# Patient Record
Sex: FEMALE | HISPANIC OR LATINO | URBAN - METROPOLITAN AREA
[De-identification: names, ages, dates, MRNs, and addresses within clinical notes are randomized per-mention and may not be internally consistent; named-entity substitution may affect disease eponyms.]

---

## 2023-11-27 ENCOUNTER — V-VISIT (OUTPATIENT)
Dept: URGENT CARE | Age: 48
End: 2023-11-27

## 2023-11-27 VITALS
SYSTOLIC BLOOD PRESSURE: 133 MMHG | OXYGEN SATURATION: 96 % | BODY MASS INDEX: 30.73 KG/M2 | TEMPERATURE: 100.4 F | HEART RATE: 91 BPM | HEIGHT: 64 IN | DIASTOLIC BLOOD PRESSURE: 81 MMHG | WEIGHT: 180 LBS | RESPIRATION RATE: 18 BRPM

## 2023-11-27 DIAGNOSIS — U07.1 COVID-19 VIRUS DETECTED: Primary | ICD-10-CM

## 2023-11-27 LAB
FLUAV AG UPPER RESP QL IA.RAPID: NEGATIVE
FLUBV AG UPPER RESP QL IA.RAPID: NEGATIVE
INTERNAL PROCEDURAL CONTROLS ACCEPTABLE: YES
S PYO AG THROAT QL IA.RAPID: NEGATIVE
SARS-COV+SARS-COV-2 AG RESP QL IA.RAPID: DETECTED
TEST LOT EXPIRATION DATE: ABNORMAL
TEST LOT EXPIRATION DATE: NORMAL
TEST LOT NUMBER: ABNORMAL
TEST LOT NUMBER: NORMAL

## 2023-11-27 PROCEDURE — 87880 STREP A ASSAY W/OPTIC: CPT | Performed by: NURSE PRACTITIONER

## 2023-11-27 PROCEDURE — 99203 OFFICE O/P NEW LOW 30 MIN: CPT | Performed by: NURSE PRACTITIONER

## 2023-11-27 PROCEDURE — 87428 SARSCOV & INF VIR A&B AG IA: CPT | Performed by: NURSE PRACTITIONER

## 2023-11-27 ASSESSMENT — ENCOUNTER SYMPTOMS
RHINORRHEA: 1
SORE THROAT: 1
WHEEZING: 0
NAUSEA: 0
SHORTNESS OF BREATH: 0
HEADACHES: 0
DIARRHEA: 0
VOMITING: 0
COUGH: 1
ABDOMINAL PAIN: 0
CHILLS: 0
FEVER: 0

## 2023-11-27 ASSESSMENT — PAIN SCALES - GENERAL: PAINLEVEL: 0

## 2024-04-09 ENCOUNTER — V-VISIT (OUTPATIENT)
Dept: URGENT CARE | Age: 49
End: 2024-04-09

## 2024-04-09 VITALS
RESPIRATION RATE: 16 BRPM | OXYGEN SATURATION: 100 % | HEIGHT: 64 IN | HEART RATE: 78 BPM | DIASTOLIC BLOOD PRESSURE: 84 MMHG | WEIGHT: 175 LBS | BODY MASS INDEX: 29.88 KG/M2 | SYSTOLIC BLOOD PRESSURE: 131 MMHG | TEMPERATURE: 98.1 F

## 2024-04-09 DIAGNOSIS — J02.9 ACUTE PHARYNGITIS, UNSPECIFIED ETIOLOGY: Primary | ICD-10-CM

## 2024-04-09 LAB
FLUAV AG UPPER RESP QL IA.RAPID: NEGATIVE
FLUBV AG UPPER RESP QL IA.RAPID: NEGATIVE
INTERNAL PROCEDURAL CONTROLS ACCEPTABLE: YES
S PYO AG THROAT QL IA.RAPID: NEGATIVE
SARS-COV+SARS-COV-2 AG RESP QL IA.RAPID: NOT DETECTED
TEST LOT EXPIRATION DATE: NORMAL
TEST LOT EXPIRATION DATE: NORMAL
TEST LOT NUMBER: NORMAL
TEST LOT NUMBER: NORMAL

## 2024-04-09 PROCEDURE — 87880 STREP A ASSAY W/OPTIC: CPT | Performed by: NURSE PRACTITIONER

## 2024-04-09 PROCEDURE — 99213 OFFICE O/P EST LOW 20 MIN: CPT | Performed by: NURSE PRACTITIONER

## 2024-04-09 PROCEDURE — 87081 CULTURE SCREEN ONLY: CPT | Performed by: CLINICAL MEDICAL LABORATORY

## 2024-04-09 PROCEDURE — 87428 SARSCOV & INF VIR A&B AG IA: CPT | Performed by: NURSE PRACTITIONER

## 2024-04-09 RX ORDER — LEVONORGESTREL 52 MG/1
1 INTRAUTERINE DEVICE INTRAUTERINE
COMMUNITY
Start: 2020-03-01 | End: 2025-03-01

## 2024-04-09 ASSESSMENT — PAIN SCALES - GENERAL: PAINLEVEL: 5

## 2024-04-12 LAB — S PYO SPEC QL CULT: NORMAL

## 2024-05-08 ENCOUNTER — OFFICE VISIT (OUTPATIENT)
Dept: INTERNAL MEDICINE CLINIC | Facility: CLINIC | Age: 49
End: 2024-05-08
Payer: COMMERCIAL

## 2024-05-08 VITALS
RESPIRATION RATE: 16 BRPM | OXYGEN SATURATION: 99 % | TEMPERATURE: 98 F | HEART RATE: 75 BPM | HEIGHT: 64 IN | WEIGHT: 172 LBS | DIASTOLIC BLOOD PRESSURE: 84 MMHG | BODY MASS INDEX: 29.37 KG/M2 | SYSTOLIC BLOOD PRESSURE: 120 MMHG

## 2024-05-08 DIAGNOSIS — R22.32 MASS OF LEFT AXILLA: ICD-10-CM

## 2024-05-08 DIAGNOSIS — Z12.31 ENCOUNTER FOR SCREENING MAMMOGRAM FOR MALIGNANT NEOPLASM OF BREAST: ICD-10-CM

## 2024-05-08 DIAGNOSIS — Z00.00 ANNUAL PHYSICAL EXAM: Primary | ICD-10-CM

## 2024-05-08 DIAGNOSIS — E04.9 ENLARGED THYROID: ICD-10-CM

## 2024-05-08 DIAGNOSIS — Z11.3 SCREENING EXAMINATION FOR STD (SEXUALLY TRANSMITTED DISEASE): ICD-10-CM

## 2024-05-08 DIAGNOSIS — Z12.11 COLON CANCER SCREENING: ICD-10-CM

## 2024-05-08 PROCEDURE — 99386 PREV VISIT NEW AGE 40-64: CPT | Performed by: INTERNAL MEDICINE

## 2024-05-08 PROCEDURE — 99203 OFFICE O/P NEW LOW 30 MIN: CPT | Performed by: INTERNAL MEDICINE

## 2024-05-08 PROCEDURE — 90715 TDAP VACCINE 7 YRS/> IM: CPT | Performed by: INTERNAL MEDICINE

## 2024-05-08 PROCEDURE — 90471 IMMUNIZATION ADMIN: CPT | Performed by: INTERNAL MEDICINE

## 2024-05-08 NOTE — H&P
Subjective:   Miriam Alvarado is a 48 year old female  who presents for Physical     Reports doing well overall.     Pap smear-follows with gyne. Last seen 3/2024. Pt to follow-up with gyne.per gyne note, due 2025   Scheduled for follow-up/diagnostic  mammogram.     Denies family hx of breast or colon cancer.  Has routine eye and dental exams.    Reports axilla folliculitis in past. Has growth there that has not gone away completely. Present for over 1 year.   Denies breast findings. Had breast exam with gyne.   Scheduled for mammogram    Remainder of ROS negative.     History/Other:    Chief Complaint Reviewed and Verified  No Further Nursing Notes to   Review  Tobacco Reviewed  Allergies Reviewed  Medications Reviewed    Problem List Reviewed  OB Status Reviewed         Current Outpatient Medications   Medication Sig Dispense Refill    levonorgestrel (MIRENA, 52 MG,) 20 MCG/24HR Intrauterine IUD 20 mcg (1 each total) by Intrauterine route once.         Review of Systems:  Pertinent items are noted in HPI.  A comprehensive 10 point review of systems was completed.  Pertinent positives and negatives noted in the the HPI.        Objective:   /84 (BP Location: Left arm, Patient Position: Sitting, Cuff Size: adult)   Pulse 75   Temp 97.9 °F (36.6 °C) (Temporal)   Resp 16   Ht 5' 4\" (1.626 m)   Wt 172 lb (78 kg)   SpO2 99%   BMI 29.52 kg/m²  Estimated body mass index is 29.52 kg/m² as calculated from the following:    Height as of this encounter: 5' 4\" (1.626 m).    Weight as of this encounter: 172 lb (78 kg).  PHYSICAL EXAM:   General: no acute distress   Eyes: pupils equal and reactive; EOMI; sclera normal; conjunctiva normal   ENT:without erythema or exudate  Neck: trachea midline; no adenopathy; thyroid possibly enlarged vs positioning   Hematologic/lymphatic:no cervical lymphadenopathy; no supraclavicular adenopathy   Respiratory: no increased work of breathing; good air exchange; CTAB; no crackles  or wheezing   Cardiovascular: RRR; S1, S2; no murmurs; no edema   Gastrointestinal: normal bowel sounds; soft; non-distended; non-tender  Neurological: awake, alert, oriented x3; CNII-XII grossly intact;   MSK: full ROM; strength 5/5  Behavioral/Psych: euthymic; appropriate affect   Skin: normal skin color; no rashes; no lesions   Noted to have possible cyst vs lipoma vs other on L axilla     Assessment & Plan:     Miriam was seen today for physical.    Diagnoses and all orders for this visit:    Annual physical exam  -     Vitamin D; Future  -     CBC With Differential With Platelet; Future  -     Comp Metabolic Panel (14); Future  -     Hemoglobin A1C; Future  -     TSH W Reflex To Free T4; Future  -     Lipid Panel; Future  -     TdaP (Boostrix) Vaccine (> 7 Y)  -     HIV AG AB Combo [E]; Future  -     RPR W/REF TO TITER & CONFIRM [66454][Q]; Future    Colon cancer screening  -     Gastro Referral - In Network    Encounter for screening mammogram for malignant neoplasm of breast    Mass of left axilla  Comments:  present for about 1 year. possible growth. cyst vs lipoma vs other  Orders:  -     Derm Referral - In Network  -     US AXILLARY LEFT SH(CPT=76882); Future    Enlarged thyroid  -     US THYROID (CPT=76536); Future  -check labs     Screening examination for STD (sexually transmitted disease)  -     HIV AG AB Combo [E]; Future  -     RPR W/REF TO TITER & CONFIRM [63767][Q]; Future              Arabella Baca MD

## 2024-05-31 ENCOUNTER — HOSPITAL ENCOUNTER (OUTPATIENT)
Dept: ULTRASOUND IMAGING | Age: 49
Discharge: HOME | End: 2024-05-31
Attending: INTERNAL MEDICINE
Payer: COMMERCIAL

## 2024-05-31 DIAGNOSIS — E04.9 ENLARGED THYROID: ICD-10-CM

## 2024-05-31 PROCEDURE — 76536 US EXAM OF HEAD AND NECK: CPT | Performed by: INTERNAL MEDICINE

## 2024-06-04 ENCOUNTER — TELEPHONE (OUTPATIENT)
Dept: INTERNAL MEDICINE CLINIC | Facility: CLINIC | Age: 49
End: 2024-06-04

## 2024-06-04 NOTE — TELEPHONE ENCOUNTER
S/w patient, verified name/. Reviewed results/recommendations below from provider. Patient voiced understanding.       Arabella Baca MD  2024 11:52 AM CDT       Results reviewed. Please inform patient that thyroid US shows nodules. 2 should be biopsied; order placed. Should also get evaluation with ENT given multiple nodules.  Referral placed.  Please also remind her to complete labs

## 2024-06-08 ENCOUNTER — LAB ENCOUNTER (OUTPATIENT)
Dept: LAB | Age: 49
End: 2024-06-08
Attending: INTERNAL MEDICINE
Payer: COMMERCIAL

## 2024-06-08 DIAGNOSIS — Z00.00 ANNUAL PHYSICAL EXAM: ICD-10-CM

## 2024-06-08 DIAGNOSIS — Z11.3 SCREENING EXAMINATION FOR STD (SEXUALLY TRANSMITTED DISEASE): ICD-10-CM

## 2024-06-08 LAB
ALBUMIN SERPL-MCNC: 4.3 G/DL (ref 3.2–4.8)
ALBUMIN/GLOB SERPL: 1.5 {RATIO} (ref 1–2)
ALP LIVER SERPL-CCNC: 65 U/L
ALT SERPL-CCNC: 27 U/L
ANION GAP SERPL CALC-SCNC: 6 MMOL/L (ref 0–18)
AST SERPL-CCNC: 23 U/L (ref ?–34)
BASOPHILS # BLD AUTO: 0.06 X10(3) UL (ref 0–0.2)
BASOPHILS NFR BLD AUTO: 0.5 %
BILIRUB SERPL-MCNC: 0.4 MG/DL (ref 0.3–1.2)
BUN BLD-MCNC: 7 MG/DL (ref 9–23)
BUN/CREAT SERPL: 9.7 (ref 10–20)
CALCIUM BLD-MCNC: 9.1 MG/DL (ref 8.7–10.4)
CHLORIDE SERPL-SCNC: 107 MMOL/L (ref 98–112)
CHOLEST SERPL-MCNC: 171 MG/DL (ref ?–200)
CO2 SERPL-SCNC: 29 MMOL/L (ref 21–32)
CREAT BLD-MCNC: 0.72 MG/DL
DEPRECATED RDW RBC AUTO: 45 FL (ref 35.1–46.3)
EGFRCR SERPLBLD CKD-EPI 2021: 104 ML/MIN/1.73M2 (ref 60–?)
EOSINOPHIL # BLD AUTO: 0.13 X10(3) UL (ref 0–0.7)
EOSINOPHIL NFR BLD AUTO: 1.1 %
ERYTHROCYTE [DISTWIDTH] IN BLOOD BY AUTOMATED COUNT: 12.7 % (ref 11–15)
EST. AVERAGE GLUCOSE BLD GHB EST-MCNC: 108 MG/DL (ref 68–126)
FASTING PATIENT LIPID ANSWER: YES
FASTING STATUS PATIENT QL REPORTED: YES
GLOBULIN PLAS-MCNC: 2.9 G/DL (ref 2–3.5)
GLUCOSE BLD-MCNC: 95 MG/DL (ref 70–99)
HBA1C MFR BLD: 5.4 % (ref ?–5.7)
HCT VFR BLD AUTO: 42.8 %
HDLC SERPL-MCNC: 54 MG/DL (ref 40–59)
HGB BLD-MCNC: 13.9 G/DL
IMM GRANULOCYTES # BLD AUTO: 0.04 X10(3) UL (ref 0–1)
IMM GRANULOCYTES NFR BLD: 0.3 %
LDLC SERPL CALC-MCNC: 107 MG/DL (ref ?–100)
LYMPHOCYTES # BLD AUTO: 2.78 X10(3) UL (ref 1–4)
LYMPHOCYTES NFR BLD AUTO: 23.9 %
MCH RBC QN AUTO: 31 PG (ref 26–34)
MCHC RBC AUTO-ENTMCNC: 32.5 G/DL (ref 31–37)
MCV RBC AUTO: 95.5 FL
MONOCYTES # BLD AUTO: 0.74 X10(3) UL (ref 0.1–1)
MONOCYTES NFR BLD AUTO: 6.4 %
NEUTROPHILS # BLD AUTO: 7.88 X10 (3) UL (ref 1.5–7.7)
NEUTROPHILS # BLD AUTO: 7.88 X10(3) UL (ref 1.5–7.7)
NEUTROPHILS NFR BLD AUTO: 67.8 %
NONHDLC SERPL-MCNC: 117 MG/DL (ref ?–130)
OSMOLALITY SERPL CALC.SUM OF ELEC: 292 MOSM/KG (ref 275–295)
PLATELET # BLD AUTO: 221 10(3)UL (ref 150–450)
POTASSIUM SERPL-SCNC: 4.2 MMOL/L (ref 3.5–5.1)
PROT SERPL-MCNC: 7.2 G/DL (ref 5.7–8.2)
RBC # BLD AUTO: 4.48 X10(6)UL
SODIUM SERPL-SCNC: 142 MMOL/L (ref 136–145)
T3FREE SERPL-MCNC: 3.6 PG/ML (ref 2.4–4.2)
T4 FREE SERPL-MCNC: 1.1 NG/DL (ref 0.8–1.7)
TRIGL SERPL-MCNC: 51 MG/DL (ref 30–149)
TSI SER-ACNC: 0.02 MIU/ML (ref 0.55–4.78)
VIT D+METAB SERPL-MCNC: 30.7 NG/ML (ref 30–100)
VLDLC SERPL CALC-MCNC: 9 MG/DL (ref 0–30)
WBC # BLD AUTO: 11.6 X10(3) UL (ref 4–11)

## 2024-06-08 PROCEDURE — 36415 COLL VENOUS BLD VENIPUNCTURE: CPT

## 2024-06-08 PROCEDURE — 87389 HIV-1 AG W/HIV-1&-2 AB AG IA: CPT

## 2024-06-08 PROCEDURE — 84439 ASSAY OF FREE THYROXINE: CPT

## 2024-06-08 PROCEDURE — 83036 HEMOGLOBIN GLYCOSYLATED A1C: CPT

## 2024-06-08 PROCEDURE — 84443 ASSAY THYROID STIM HORMONE: CPT

## 2024-06-08 PROCEDURE — 80061 LIPID PANEL: CPT

## 2024-06-08 PROCEDURE — 82306 VITAMIN D 25 HYDROXY: CPT

## 2024-06-08 PROCEDURE — 85025 COMPLETE CBC W/AUTO DIFF WBC: CPT

## 2024-06-08 PROCEDURE — 84481 FREE ASSAY (FT-3): CPT

## 2024-06-08 PROCEDURE — 86592 SYPHILIS TEST NON-TREP QUAL: CPT

## 2024-06-08 PROCEDURE — 80053 COMPREHEN METABOLIC PANEL: CPT

## 2024-06-11 LAB — RPR SER QL: NONREACTIVE

## 2024-06-12 ENCOUNTER — HOSPITAL ENCOUNTER (OUTPATIENT)
Dept: ULTRASOUND IMAGING | Facility: HOSPITAL | Age: 49
Discharge: HOME | End: 2024-06-12
Attending: INTERNAL MEDICINE
Payer: COMMERCIAL

## 2024-06-12 DIAGNOSIS — E04.2 MULTIPLE THYROID NODULES: ICD-10-CM

## 2024-06-12 PROCEDURE — 88173 CYTOPATH EVAL FNA REPORT: CPT | Performed by: INTERNAL MEDICINE

## 2024-06-12 PROCEDURE — 10006 FNA BX W/US GDN EA ADDL: CPT | Performed by: INTERNAL MEDICINE

## 2024-06-12 PROCEDURE — 10005 FNA BX W/US GDN 1ST LES: CPT | Performed by: INTERNAL MEDICINE

## 2024-06-13 DIAGNOSIS — E04.2 MULTIPLE THYROID NODULES: ICD-10-CM

## 2024-06-13 DIAGNOSIS — R94.6 ABNORMAL THYROID FUNCTION TEST: Primary | ICD-10-CM

## 2024-07-16 ENCOUNTER — OFFICE VISIT (OUTPATIENT)
Facility: LOCATION | Age: 49
End: 2024-07-16
Payer: COMMERCIAL

## 2024-07-16 DIAGNOSIS — E04.2 MULTIPLE THYROID NODULES: Primary | ICD-10-CM

## 2024-07-16 PROCEDURE — 99203 OFFICE O/P NEW LOW 30 MIN: CPT | Performed by: OTOLARYNGOLOGY

## 2024-07-16 NOTE — PROGRESS NOTES
Miriam Alvarado is a 48 year old female.   Chief Complaint   Patient presents with    Thyroid Nodule     HPI:   Approximately once a year she gets tonsillitis and sore throat.  She was found to have an enlarged thyroid gland.  She has no family history of thyroid cancer she is not on thyroid medication.  Current Outpatient Medications   Medication Sig Dispense Refill    levonorgestrel (MIRENA, 52 MG,) 20 MCG/24HR Intrauterine IUD 20 mcg (1 each total) by Intrauterine route once.        Past Medical History:    Asthma (HCC)    Dx'd as child, but no issues as adult      Social History:  Social History     Socioeconomic History    Marital status:    Tobacco Use    Smoking status: Never    Smokeless tobacco: Never   Vaping Use    Vaping status: Never Used   Substance and Sexual Activity    Alcohol use: Yes     Comment: Occ    Drug use: Never    Sexual activity: Yes     Partners: Male     Birth control/protection: I.U.D.      Past Surgical History:   Procedure Laterality Date    Cyst aspiration left  2019    Cyst removal  2017    Near breast and on back         REVIEW OF SYSTEMS:   GENERAL HEALTH: feels well otherwise  GENERAL : denies fever, chills, sweats, weight loss, weight gain  SKIN: denies any unusual skin lesions or rashes  RESPIRATORY: denies shortness of breath with exertion  NEURO: denies headaches    EXAM:   There were no vitals taken for this visit.    System Findings Details   Constitutional  Overall appearance - Normal.   Psychiatric  Orientation - Oriented to time, place, person & situation. Appropriate mood and affect.   Head/Face  Facial features -- Normal. Skull - Normal.   Eyes  Pupils equal ,round ,react to light and accomidate   Ears, Nose, Throat, Neck  Nose clear oral cavity clear oropharynx +2 of 4 tonsils neck supple with enlarged thyroid gland   Neurological  Memory - Normal. Cranial nerves - Cranial nerves II through XII grossly intact.   Lymph Detail  Submental. Submandibular. Anterior  cervical. Posterior cervical. Supraclavicular.       ASSESSMENT AND PLAN:   1. Multiple thyroid nodules  Thyroid ultrasound reviewed.  This shows multiple nodules.  Biopsies were performed and they are negative for malignancy.  She also has subclinical hyperthyroidism.  I agree that she should undergo repeat lab testing to follow the abnormal lab values.  We discussed indications for thyroid surgery and that she does not meet the indications at this time.  She will have a repeat thyroid ultrasound in 1 year.      The patient indicates understanding of these issues and agrees to the plan.    Return in about 1 year (around 7/16/2025) for thyroid ultrasound.    Mauricio Newsome MD  7/16/2024  9:25 AM

## 2024-07-24 ENCOUNTER — OFFICE VISIT (OUTPATIENT)
Dept: INTERNAL MEDICINE CLINIC | Facility: CLINIC | Age: 49
End: 2024-07-24
Payer: COMMERCIAL

## 2024-07-24 VITALS
WEIGHT: 166.63 LBS | OXYGEN SATURATION: 98 % | TEMPERATURE: 98 F | BODY MASS INDEX: 29 KG/M2 | HEART RATE: 68 BPM | RESPIRATION RATE: 15 BRPM | DIASTOLIC BLOOD PRESSURE: 60 MMHG | SYSTOLIC BLOOD PRESSURE: 114 MMHG

## 2024-07-24 DIAGNOSIS — E04.2 MULTIPLE THYROID NODULES: ICD-10-CM

## 2024-07-24 DIAGNOSIS — E05.90 SUBCLINICAL HYPERTHYROIDISM: ICD-10-CM

## 2024-07-24 DIAGNOSIS — R22.32 MASS OF LEFT AXILLA: ICD-10-CM

## 2024-07-24 DIAGNOSIS — M95.4 DEFORMITY OF STERNUM: Primary | ICD-10-CM

## 2024-07-24 PROCEDURE — 99214 OFFICE O/P EST MOD 30 MIN: CPT | Performed by: INTERNAL MEDICINE

## 2024-07-24 NOTE — PROGRESS NOTES
Subjective:   Miriam Alvarado is a 48 year old female  who presents for Follow - Up     Thyroid nodules- had biopsies. seeing ENT; due for follow-up again in 7/2025  Subclinical hyperthyroidism- to seen endo   To repeat labs.   Denies symptoms.   Will be making appt with endo.     Lymph node by L axilla - to complete US.   Also has noted more protrusion of sternum. No pain or rashes  History/Other:    Chief Complaint Reviewed and Verified  No Further Nursing Notes to   Review  Tobacco Reviewed  Allergies Reviewed  Medications Reviewed  OB   Status Reviewed         Current Outpatient Medications   Medication Sig Dispense Refill    levonorgestrel (MIRENA, 52 MG,) 20 MCG/24HR Intrauterine IUD 20 mcg (1 each total) by Intrauterine route once.         Review of Systems:  Pertinent items are noted in HPI.  A comprehensive 10 point review of systems was completed.  Pertinent positives and negatives noted in the the HPI.        Objective:   /60 (BP Location: Right arm, Patient Position: Sitting, Cuff Size: adult)   Pulse 68   Temp 97.9 °F (36.6 °C) (Temporal)   Resp 15   Wt 166 lb 9.6 oz (75.6 kg)   LMP  (LMP Unknown)   SpO2 98%   BMI 28.60 kg/m²  Estimated body mass index is 28.6 kg/m² as calculated from the following:    Height as of 5/8/24: 5' 4\" (1.626 m).    Weight as of this encounter: 166 lb 9.6 oz (75.6 kg).  PHYSICAL EXAM:   General: no acute distress   Respiratory: no increased work of breathing;   Neurological: awake, alert, oriented x3; CNII-XII grossly intact;  Behavioral/Psych: euthymic; appropriate affect   Skin: no rashes on trunk   more protrusion of sternum but no pain     Assessment & Plan:   Miriam was seen today for follow - up.    Diagnoses and all orders for this visit:    Protrusion of sternum  -     XR STERNUM (MIN 2 VIEWS) (CPT=71120); Future    Mass of left axilla  -complete US    Multiple thyroid nodules  Subclinical hyperthyroidism  -repeat labs  -follow-up with ENT as  recommended with US in 1 year; sooner pr  -make appt with endo                   Arabella Baca MD

## 2024-08-02 ENCOUNTER — HOSPITAL ENCOUNTER (OUTPATIENT)
Dept: GENERAL RADIOLOGY | Age: 49
Discharge: HOME | End: 2024-08-02
Attending: INTERNAL MEDICINE
Payer: COMMERCIAL

## 2024-08-02 DIAGNOSIS — M95.4 DEFORMITY OF STERNUM: ICD-10-CM

## 2024-08-02 PROCEDURE — 71120 X-RAY EXAM BREASTBONE 2/>VWS: CPT | Performed by: INTERNAL MEDICINE

## 2024-08-21 ENCOUNTER — HOSPITAL ENCOUNTER (OUTPATIENT)
Dept: CT IMAGING | Age: 49
Discharge: HOME | End: 2024-08-21
Attending: INTERNAL MEDICINE
Payer: COMMERCIAL

## 2024-08-21 DIAGNOSIS — M95.4 DEFORMITY OF STERNUM: ICD-10-CM

## 2024-08-21 PROCEDURE — 71250 CT THORAX DX C-: CPT | Performed by: INTERNAL MEDICINE

## 2024-10-09 ENCOUNTER — LAB ENCOUNTER (OUTPATIENT)
Dept: LAB | Age: 49
End: 2024-10-09
Attending: INTERNAL MEDICINE
Payer: COMMERCIAL

## 2024-10-09 ENCOUNTER — OFFICE VISIT (OUTPATIENT)
Dept: INTERNAL MEDICINE CLINIC | Facility: CLINIC | Age: 49
End: 2024-10-09
Payer: COMMERCIAL

## 2024-10-09 VITALS
BODY MASS INDEX: 27.77 KG/M2 | OXYGEN SATURATION: 98 % | HEART RATE: 79 BPM | TEMPERATURE: 98 F | DIASTOLIC BLOOD PRESSURE: 70 MMHG | SYSTOLIC BLOOD PRESSURE: 120 MMHG | HEIGHT: 64 IN | WEIGHT: 162.63 LBS | RESPIRATION RATE: 15 BRPM

## 2024-10-09 DIAGNOSIS — Z11.3 SCREENING EXAMINATION FOR STD (SEXUALLY TRANSMITTED DISEASE): ICD-10-CM

## 2024-10-09 DIAGNOSIS — R94.6 ABNORMAL THYROID FUNCTION TEST: ICD-10-CM

## 2024-10-09 DIAGNOSIS — N89.8 VAGINAL DISCHARGE: ICD-10-CM

## 2024-10-09 DIAGNOSIS — E04.2 MULTIPLE THYROID NODULES: ICD-10-CM

## 2024-10-09 DIAGNOSIS — A60.00 GENITAL HERPES SIMPLEX, UNSPECIFIED SITE: Primary | ICD-10-CM

## 2024-10-09 DIAGNOSIS — Z12.4 CERVICAL CANCER SCREENING: ICD-10-CM

## 2024-10-09 DIAGNOSIS — E05.90 SUBCLINICAL HYPERTHYROIDISM: ICD-10-CM

## 2024-10-09 LAB
BASOPHILS # BLD AUTO: 0.04 X10(3) UL (ref 0–0.2)
BASOPHILS NFR BLD AUTO: 0.5 %
EOSINOPHIL # BLD AUTO: 0.1 X10(3) UL (ref 0–0.7)
EOSINOPHIL NFR BLD AUTO: 1.2 %
ERYTHROCYTE [DISTWIDTH] IN BLOOD BY AUTOMATED COUNT: 12.5 %
HBV SURFACE AB SER QL: NONREACTIVE
HBV SURFACE AB SERPL IA-ACNC: <3.1 MIU/ML
HBV SURFACE AG SER-ACNC: 0.1 [IU]/L
HBV SURFACE AG SERPL QL IA: NONREACTIVE
HCT VFR BLD AUTO: 37.6 %
HCV AB SERPL QL IA: NONREACTIVE
HGB BLD-MCNC: 12.9 G/DL
IMM GRANULOCYTES # BLD AUTO: 0.03 X10(3) UL (ref 0–1)
IMM GRANULOCYTES NFR BLD: 0.3 %
LYMPHOCYTES # BLD AUTO: 2.96 X10(3) UL (ref 1–4)
LYMPHOCYTES NFR BLD AUTO: 34.4 %
MCH RBC QN AUTO: 31.5 PG (ref 26–34)
MCHC RBC AUTO-ENTMCNC: 34.3 G/DL (ref 31–37)
MCV RBC AUTO: 91.9 FL
MONOCYTES # BLD AUTO: 0.51 X10(3) UL (ref 0.1–1)
MONOCYTES NFR BLD AUTO: 5.9 %
NEUTROPHILS # BLD AUTO: 4.96 X10 (3) UL (ref 1.5–7.7)
NEUTROPHILS # BLD AUTO: 4.96 X10(3) UL (ref 1.5–7.7)
NEUTROPHILS NFR BLD AUTO: 57.7 %
PLATELET # BLD AUTO: 203 10(3)UL (ref 150–450)
RBC # BLD AUTO: 4.09 X10(6)UL
T PALLIDUM AB SER QL IA: NONREACTIVE
T4 FREE SERPL-MCNC: 1.4 NG/DL (ref 0.8–1.7)
TSI SER-ACNC: <0.008 MIU/ML (ref 0.55–4.78)
WBC # BLD AUTO: 8.6 X10(3) UL (ref 4–11)

## 2024-10-09 PROCEDURE — 86706 HEP B SURFACE ANTIBODY: CPT

## 2024-10-09 PROCEDURE — 87624 HPV HI-RISK TYP POOLED RSLT: CPT | Performed by: INTERNAL MEDICINE

## 2024-10-09 PROCEDURE — 85025 COMPLETE CBC W/AUTO DIFF WBC: CPT

## 2024-10-09 PROCEDURE — 87591 N.GONORRHOEAE DNA AMP PROB: CPT | Performed by: INTERNAL MEDICINE

## 2024-10-09 PROCEDURE — 36415 COLL VENOUS BLD VENIPUNCTURE: CPT

## 2024-10-09 PROCEDURE — 87491 CHLMYD TRACH DNA AMP PROBE: CPT | Performed by: INTERNAL MEDICINE

## 2024-10-09 PROCEDURE — 84443 ASSAY THYROID STIM HORMONE: CPT

## 2024-10-09 PROCEDURE — 87801 DETECT AGNT MULT DNA AMPLI: CPT | Performed by: INTERNAL MEDICINE

## 2024-10-09 PROCEDURE — 84439 ASSAY OF FREE THYROXINE: CPT

## 2024-10-09 PROCEDURE — 86780 TREPONEMA PALLIDUM: CPT

## 2024-10-09 PROCEDURE — 81514 NFCT DS BV&VAGINITIS DNA ALG: CPT | Performed by: INTERNAL MEDICINE

## 2024-10-09 PROCEDURE — 86803 HEPATITIS C AB TEST: CPT

## 2024-10-09 PROCEDURE — 99214 OFFICE O/P EST MOD 30 MIN: CPT | Performed by: INTERNAL MEDICINE

## 2024-10-09 PROCEDURE — 88175 CYTOPATH C/V AUTO FLUID REDO: CPT | Performed by: INTERNAL MEDICINE

## 2024-10-09 PROCEDURE — 87389 HIV-1 AG W/HIV-1&-2 AB AG IA: CPT

## 2024-10-09 PROCEDURE — 87340 HEPATITIS B SURFACE AG IA: CPT

## 2024-10-09 RX ORDER — VALACYCLOVIR HYDROCHLORIDE 500 MG/1
500 TABLET, FILM COATED ORAL AS DIRECTED
Qty: 30 TABLET | Refills: 0 | Status: SHIPPED | OUTPATIENT
Start: 2024-10-09

## 2024-10-09 NOTE — PROGRESS NOTES
Subjective:   Miriam Alvarado is a 49 year old female  who presents for Follow - Up     Subclinical hyperthyroidism- still needs to complete repeat labs. And follow-up with endo-reminded.     To follow-up with gyne regarding IUD- boyfriend can feel it. Pt to get string checked. Pt wo pain.     Would also like to get STD check.   Reports vaginal yeast infections recently.     History/Other:    Chief Complaint Reviewed and Verified  No Further Nursing Notes to   Review  Tobacco Reviewed  Allergies Reviewed  Medications Reviewed  OB   Status Reviewed         Current Outpatient Medications   Medication Sig Dispense Refill    levonorgestrel (MIRENA, 52 MG,) 20 MCG/24HR Intrauterine IUD 20 mcg (1 each total) by Intrauterine route once.         Review of Systems:  Pertinent items are noted in HPI.  A comprehensive 10 point review of systems was completed.  Pertinent positives and negatives noted in the the HPI.        Objective:   /70 (BP Location: Left arm, Patient Position: Sitting, Cuff Size: adult)   Pulse 79   Temp 97.7 °F (36.5 °C) (Temporal)   Resp 15   Ht 5' 4\" (1.626 m)   Wt 162 lb 9.6 oz (73.8 kg)   LMP  (LMP Unknown)   SpO2 98%   BMI 27.91 kg/m²  Estimated body mass index is 27.91 kg/m² as calculated from the following:    Height as of this encounter: 5' 4\" (1.626 m).    Weight as of this encounter: 162 lb 9.6 oz (73.8 kg).  PHYSICAL EXAM:   General: no acute distress   Respiratory: no increased work of breathing; Neurological: awake, alert, oriented x3; CNII-XII grossly intact;  Behavioral/Psych: euthymic; appropriate affect       Assessment & Plan:   Miriam was seen today for follow - up.    Diagnoses and all orders for this visit:    Subclinical hyperthyroidism  -check labs   -follow-up with endo      Screening examination for STD (sexually transmitted disease)  -     Vaginitis/Vaginosis, Dna Probe; Future  -     T Pallidum Screening Josephine; Future  -     HIV AG AB Combo (Consent Obtained  prechecked); Future  -     Hepatitis B Surface Antibody; Future  -     Hepatitis B Surface Antigen; Future  -     HCV Antibody; Future  -     Chlamydia/Gc Amplification; Future    Cervical cancer screening  -     ThinPrep PAP with HPV Reflex Request; Future    Genital herpes simplex, unspecified site  -     valACYclovir 500 MG Oral Tab; Take 1 tablet (500 mg total) by mouth As Directed. For herpes flares: 500 mg twice daily for 3 days    Vaginal discharge  -     Vaginitis/Vaginosis, Dna Probe; Future  -     T Pallidum Screening Edgar; Future  -     HIV AG AB Combo (Consent Obtained prechecked); Future  -     Chlamydia/Gc Amplification; Future    To also follow-up with elizabeth Baca MD

## 2024-10-10 LAB
BV BACTERIA DNA VAG QL NAA+PROBE: NEGATIVE
C GLABRATA DNA VAG QL NAA+PROBE: NEGATIVE
C KRUSEI DNA VAG QL NAA+PROBE: NEGATIVE
C TRACH DNA SPEC QL NAA+PROBE: NEGATIVE
CANDIDA DNA VAG QL NAA+PROBE: NEGATIVE
N GONORRHOEA DNA SPEC QL NAA+PROBE: NEGATIVE
T VAGINALIS DNA VAG QL NAA+PROBE: NEGATIVE

## 2024-10-15 LAB
.: NORMAL
.: NORMAL
HPV E6+E7 MRNA CVX QL NAA+PROBE: NEGATIVE

## 2024-12-18 ENCOUNTER — OFFICE VISIT (OUTPATIENT)
Dept: INTERNAL MEDICINE CLINIC | Facility: CLINIC | Age: 49
End: 2024-12-18
Payer: COMMERCIAL

## 2024-12-18 VITALS
HEART RATE: 76 BPM | TEMPERATURE: 98 F | OXYGEN SATURATION: 99 % | BODY MASS INDEX: 27 KG/M2 | SYSTOLIC BLOOD PRESSURE: 120 MMHG | WEIGHT: 154.38 LBS | DIASTOLIC BLOOD PRESSURE: 60 MMHG

## 2024-12-18 DIAGNOSIS — E04.2 MULTIPLE THYROID NODULES: ICD-10-CM

## 2024-12-18 DIAGNOSIS — E05.90 SUBCLINICAL HYPERTHYROIDISM: ICD-10-CM

## 2024-12-18 DIAGNOSIS — K59.00 CONSTIPATION, UNSPECIFIED CONSTIPATION TYPE: ICD-10-CM

## 2024-12-18 DIAGNOSIS — Z12.11 COLON CANCER SCREENING: ICD-10-CM

## 2024-12-18 DIAGNOSIS — M25.562 LEFT KNEE PAIN, UNSPECIFIED CHRONICITY: Primary | ICD-10-CM

## 2024-12-18 PROCEDURE — 99214 OFFICE O/P EST MOD 30 MIN: CPT | Performed by: INTERNAL MEDICINE

## 2024-12-18 RX ORDER — MELOXICAM 15 MG/1
15 TABLET ORAL DAILY PRN
Qty: 20 TABLET | Refills: 0 | Status: SHIPPED | OUTPATIENT
Start: 2024-12-18

## 2024-12-18 NOTE — PROGRESS NOTES
Subjective:   Miriam Alvarado is a 49 year old female  who presents for Pain (Follow Up )     Left knee/leg pain- radiated at times.   Denies injuries/rashes/swelling   Feeling better.     Subclinical hyperthyroidism- was advised previously to see endo; has appt coming up     Of note, started semaglutide through formulary outside.     Reports that for years has been constipated. Only has bowel movements if she takes laxative  Denies abdominal pain/n/v    History/Other:    Chief Complaint Reviewed and Verified  Nursing Notes Reviewed and   Verified  Tobacco Reviewed  Allergies Reviewed  Medical History   Reviewed  Surgical History Reviewed  OB Status Reviewed  Family History   Reviewed  Social History Reviewed         Current Outpatient Medications   Medication Sig Dispense Refill    SEMAGLUTIDE-WEIGHT MANAGEMENT SC Inject into the skin.      Meloxicam 15 MG Oral Tab Take 1 tablet (15 mg total) by mouth daily as needed for Pain. Do NOT take with other NSAIDs 20 tablet 0    valACYclovir 500 MG Oral Tab Take 1 tablet (500 mg total) by mouth As Directed. For herpes flares: 500 mg twice daily for 3 days 30 tablet 0    levonorgestrel (MIRENA, 52 MG,) 20 MCG/24HR Intrauterine IUD 20 mcg (1 each total) by Intrauterine route once.         Review of Systems:  Pertinent items are noted in HPI.  A comprehensive 10 point review of systems was completed.  Pertinent positives and negatives noted in the the HPI.        Objective:   /60 (BP Location: Left arm, Patient Position: Sitting, Cuff Size: adult)   Pulse 76   Temp 98.2 °F (36.8 °C) (Temporal)   Wt 154 lb 6.4 oz (70 kg)   LMP  (LMP Unknown)   SpO2 99%   BMI 26.50 kg/m²  Estimated body mass index is 26.5 kg/m² as calculated from the following:    Height as of 10/9/24: 5' 4\" (1.626 m).    Weight as of this encounter: 154 lb 6.4 oz (70 kg).  PHYSICAL EXAM:   General: no acute distress    Respiratory: no increased work of breathing;   Neurological: awake, alert,  oriented x3; CNII-XII grossly intact;  MSK: full ROM; strength 5/5  Behavioral/Psych: euthymic; appropriate affect   Skin: normal skin color; no knee rashes     Assessment & Plan:   Miriam was seen today for pain.    Diagnoses and all orders for this visit:    Left knee pain, unspecified chronicity  -     Meloxicam 15 MG Oral Tab; Take 1 tablet (15 mg total) by mouth daily as needed for Pain. Do NOT take with other NSAIDs  -     XR Knee (non-replaced) left complete (4 or more views)- [facility] Consult Only; Future    Multiple thyroid nodules  Subclinical hyperthyroidism  To seen endo  -to also review regarding use of semaglutide    Constipation, unspecified constipation type-years   -     Gastro Referral - In Network  -     XR ABDOMEN (1 VIEW) (CPT=74018); Future  -supportive care     Colon cancer screening  -     Gastro Referral - In Network                Arabella Baca MD

## 2024-12-24 ENCOUNTER — OFFICE VISIT (OUTPATIENT)
Facility: CLINIC | Age: 49
End: 2024-12-24
Payer: COMMERCIAL

## 2024-12-24 VITALS
DIASTOLIC BLOOD PRESSURE: 88 MMHG | RESPIRATION RATE: 18 BRPM | OXYGEN SATURATION: 99 % | BODY MASS INDEX: 26.12 KG/M2 | SYSTOLIC BLOOD PRESSURE: 128 MMHG | HEIGHT: 64 IN | HEART RATE: 98 BPM | WEIGHT: 153 LBS

## 2024-12-24 DIAGNOSIS — E05.90 SUBCLINICAL HYPERTHYROIDISM: Primary | ICD-10-CM

## 2024-12-24 PROCEDURE — 99205 OFFICE O/P NEW HI 60 MIN: CPT | Performed by: STUDENT IN AN ORGANIZED HEALTH CARE EDUCATION/TRAINING PROGRAM

## 2024-12-24 NOTE — PROGRESS NOTES
Endocrinology Clinic Note    Name: Miriam Alvarado    Date: 12/26/2024      HISTORY OF PRESENT ILLNESS   Miriam Alvarado is a 49 year old female who presents for consultation for subclinical hyperthyroidism.  In addition she has thyroid nodules and has undergone FNA       HPI:   - Pt reports having often issues with sore throat and Strep, unsure if it is her tonsils (gets through infection more than>1/yr), felt that her thryoid gland was enlarged.  Found to have an enlarged thyroid gland by her providers  -COVID tested +ve in x 3 for the past 3 yrs  -Had a thyroid ultrasound which showed multiple nodules.  She underwent biopsy for these which were negative for malignancy  -During labs noted to have subclinical hypothyroidism x 2.  -Was told that she had some thyroid issues during pregnancy - about 27 yrs ago.  Never been on any thyroid medications.  -Reports some burning sensation, tingling sensation in the neck.  In addition she feels the same sensation in lower extremities.  Is losing weight -on semaglutide.    -Denies any compressive symptoms in her neck.   -No history of radiation   -Takes Supplements: Milk thistle, Magnesium 400, Digestive enzyme,Probiotic, UroVaginal probiotic, Probiotic fiber gummies, Apple cider vinegar, hardcore liquid heat (hydroxy cut)       ROS: Negative for Tremors, Nervousness, emotional lability, palpitations, muscle weakness, eye and vision changes, skin changes, dyspnea.   -Has weight loss: on semaglutide since Aug 2024., lost 20 pounds.   -Denies diarrhea: Patient reports constipation + relieved with special tea (no marte  -Denies history of amiodarone use:  Reproductive history: irregular periods and delayed periods since IUD x 5 yrs.   Smoking history: one miscarriage in 2007, 2 kids, youngest is 23 (2000). Non smoker   (+) Fhx of thyroid disease? Maternal aunt with thyroid cancer  Recent viral illness?  Not recent does get viral infections as mentioned above  Radiation exposure?   None  Neck surgery?  None    Current thyroid medications: none    Regard thyroid nodules:  -enlarged thyroid at PCP office 5/2024  -S/p Bx of both nodules  -evaluated by ENT in 7/2024: plan to repeat labs and thyroid nodules    -US showed 2 nodules:   -CT chest: (non contrast) 8/21/24: Diffuse thyroid heterogeneity with asymmetric enlargement of the right thyroid lobe.  This would be best assessed with thyroid ultrasound.       REVIEW OF SYSTEMS  Ten point review of systems has been performed and is otherwise negative and/or non-contributory, except as described above.    Medications:     Current Outpatient Medications:     SEMAGLUTIDE-WEIGHT MANAGEMENT SC, Inject into the skin., Disp: , Rfl:     Meloxicam 15 MG Oral Tab, Take 1 tablet (15 mg total) by mouth daily as needed for Pain. Do NOT take with other NSAIDs, Disp: 20 tablet, Rfl: 0    valACYclovir 500 MG Oral Tab, Take 1 tablet (500 mg total) by mouth As Directed. For herpes flares: 500 mg twice daily for 3 days, Disp: 30 tablet, Rfl: 0    levonorgestrel (MIRENA, 52 MG,) 20 MCG/24HR Intrauterine IUD, 20 mcg (1 each total) by Intrauterine route once., Disp: , Rfl:      Allergies:   Allergies[1]    Social History:   Social History     Socioeconomic History    Marital status:    Tobacco Use    Smoking status: Never    Smokeless tobacco: Never   Vaping Use    Vaping status: Never Used   Substance and Sexual Activity    Alcohol use: Yes     Alcohol/week: 4.0 standard drinks of alcohol     Types: 4 Standard drinks or equivalent per week     Comment: Weekend Drinker    Drug use: Never    Sexual activity: Yes     Partners: Male     Birth control/protection: I.U.D.   Other Topics Concern    Caffeine Concern No    Exercise No    Seat Belt No    Special Diet No    Stress Concern No    Weight Concern Yes       Medical History:   Past Medical History:    Asthma (HCC)    Dx'd as child, but no issues as adult    Sleep apnea         Surgical history:   Past Surgical  History:   Procedure Laterality Date    Cyst aspiration left  2019    Cyst removal  2017    Near breast and on back          Other surgical history      Skin surgery  2018    Removal of cysts       PHYSICAL EXAMINATION:  /88   Pulse 98   Resp 18   Ht 5' 4\" (1.626 m)   Wt 153 lb (69.4 kg)   LMP  (LMP Unknown)   SpO2 99%   BMI 26.26 kg/m²     CONSTITUTIONAL:  awake, alert, cooperative, no apparent distress, and appears stated age  PSYCH: normal affect  EYES:  No proptosis,  conjunctiva normal  ENT:  Normocephalic, atraumatic  NECK:  Supple, symmetrical, mild thyroid fullness felt.    LUNGS: breathing comfortably  CARDIOVASCULAR:  regular rate   ABDOMEN:  soft  SKIN:  no rashes and no lesions  EXTREMITIES: no edema      Labs:  Lab Results   Component Value Date    T4F 1.4 10/09/2024    TSH <0.008 (L) 10/09/2024      Recent Labs     24  1022 10/09/24  0835   T4F 1.1 1.4   TSH 0.017* <0.008*        Imaging:  US THYROID (CPT=76536)    Result Date: 2024  MEASUREMENTS:   RIGHT LOBE:  6.0 x 2.4 x 3.0 cm   LEFT LOBE:  5.0 x 2.0 by cm   ISTHMUS:  0.3 cm      NODULES:  Bilateral thyroid nodules are noted.      An index right thyroid nodule is noted measuring up to 2.8 x 1.3 x 2.8 cm.  This nodule is solid and hypoechoic.TR4 - Moderately Suspicious (FNA if > or = 1.5 cm.  Follow if > or = 1 cm.).       An additional nodule in the mid right thyroid measures up to 0.9 x 0.5 x 0.9 cm.  This nodule is solid and cystic and isoechoic.TR2 - Not Suspicious (No FNA).       Additional right thyroid nodules are noted.      The largest left thyroid nodule measures up to 1.7 x 1.3 x 1.6 cm.  This nodule is solid and isoechoic.  Peripheral calcifications are noted.TR4 - Moderately Suspicious (FNA if > or = 1.5 cm.  Follow if > or = 1 cm.).       An additional July in the midpole of the left thyroid measures up to 1.0 x 0.8 x 1.0 cm.  This nodule is solid and hypoechoic.TR4 - Moderately Suspicious (FNA if > or =  1.5 cm.  Follow if > or = 1 cm.).       Additional left thyroid nodules are noted.      OTHER:  None.   CONCLUSION:  1. Bilateral thyroid nodules as above.  Ultrasound-guided biopsy of the largest nodule in the each lobe of the thyroid is recommended.   ACR TI-RADS recommendations: TR5 - FNA if greater than or equal to 1 cm, follow-up if 0.5-0.9 cm every year for 5 years. TR4 - FNA if greater than or equal to 1.5 cm, follow-up if 1-1.4 cm in 1, 2, 3 and 5 years. TR3 - FNA if greater than or equal to 2.5 cm, follow-up if 1.5-2.4 cm in 1, 3 and 5 years TR1 and TR2 - no FNA or follow-up  Please note ACR TI-RADS recommendations are based on imaging features and size of the nodule only.  In certain clinical circumstances additional or alternative evaluation may be indicated.   LOCATION:  [facility]        Dictated by (CST): Korey Mesa MD on 5/31/2024 at 6:37 PM     Finalized by (CST): Korey Mesa MD on 5/31/2024 at 6:46 PM         ASSESSMENT/PLAN:    #Subclinical hyperthyroidism:  -I have reviewed the following:  Thyroid gland structure and function  Thyroid axis  Etiology of hyperthyroidism including causes: Graves' disease, toxic adenoma, thyroiditis  -Discussed the utility of nuclear medicine thyroid scan and uptake to differentiate between these causes.  -But first we will get TFTs to confirm subclinical hyperthyroidism.  Patient instructed to hold her supplements for 5 days prior to getting labs.  -If labs show persistent subclinical hyperthyroidism we will proceed with thyroid uptake and scan.  -Patient does not have any complications of atrial fibrillation, osteoporosis        #Thyroid nodules:  -Incidentally noted on physical exam.  -Confirmed with thyroid ultrasound 5/2024.  I am unable to view the images, but per report noted to have 2.8 cm TR 4 right thyroid nodule and 1.7 cm TR 4 left thyroid nodule noted.  -Other nodules noted but do not meet criteria for FNA  -Patient underwent FNA biopsies consistent with  benign follicular nodule    Discussed common occurrence of thyroid nodules in the population approx 50-60% of the population  -Discussed that since nodule is benign then plan to follow with yearly thyroid ultrasound since ~3% of benign nodules can become malignant    Orders Placed This Encounter   Procedures    TSH and Free T4     Standing Status:   Future     Standing Expiration Date:   12/24/2025     Order Specific Question:   Release to patient     Answer:   Immediate    Thyroid Stimulating Immunoglob     Standing Status:   Future     Standing Expiration Date:   12/24/2025     Order Specific Question:   Release to patient     Answer:   Immediate    Human Antimouse Antibodies     Standing Status:   Future     Standing Expiration Date:   12/24/2025     Order Specific Question:   Release to patient     Answer:   Immediate    Thyroxine (T4), Free, Dialysis/Mass Spectrometry ([facility])     Standing Status:   Future     Standing Expiration Date:   12/24/2025     Order Specific Question:   Release to patient     Answer:   Immediate    Thyrotropin Receptor Ab, Serum     Standing Status:   Future     Standing Expiration Date:   12/26/2025     Order Specific Question:   Release to patient     Answer:   Immediate        ICD-10-CM    1. Subclinical hyperthyroidism  E05.90 TSH and Free T4     Thyroid Stimulating Immunoglob     Human Antimouse Antibodies     Thyroxine (T4), Free, Dialysis/Mass Spectrometry ([facility])     Thyrotropin Receptor Ab, Serum            ICD-10-CM    1. Subclinical hyperthyroidism  E05.90 TSH and Free T4     Thyroid Stimulating Immunoglob     Human Antimouse Antibodies     Thyroxine (T4), Free, Dialysis/Mass Spectrometry ([facility])     Thyrotropin Receptor Ab, Serum            The above plan was discussed in detail with the patient who verbalized understanding and agreement.      A total of 60 minutes was spent today on obtaining history, reviewing outside records,  reviewing pertinent labs/imaging, reviewing relevant pathophysiology with patient, evaluating patient, providing multiple treatment options, communicating with patient's other providers as appropriate, and completing documentation and orders.      Nidhi Phelan MD  [facility]  12/24/2024     Note to patient: The 21 Century Cures Act makes medical notes like these available to patients in the interest of transparency. However, be advised this is a medical document. It is intended as peer to peer communication. It is written in medical language and may contain abbreviations or verbiage that are unfamiliar. It may appear blunt or direct. Medical documents are intended to carry relevant information, facts as evident, and the clinical opinion of the practitioner.      In reviewing this note, please be advised that Dragon Voice Recognition software used to dictate the note may have made errors in recognizing some of the words or phrases.            [1] No Known Allergies

## 2024-12-24 NOTE — PATIENT INSTRUCTIONS
Summary of our visit:  - Lets get labs today. Hold your supplements for 5 days before labs   -will be in touch after that  - If you feel any symptoms of high thyroid which are: feeling anxious, jittery, heart racing, tremors let me know       General follow up information:  Please let us know if you require any refills at least 1 week prior to your medication running out. If you do run out of medication, please call our office ASAP to request refills (do not wait until your follow up).  Please call us if you experience any problems with insurance coverage of medication, lab work, or imaging.   Lab results and imaging will typically be reviewed at follow up appointments, or within 3-5 business days of ALL results being in if you do not have an appointment scheduled in the near future. Our office will contact you for any abnormal results requiring more urgent follow up or action.   Return Visit     [x] Dr. Phelan in 3 months   [x] Fasting/8AM labs  [x] Central scheduling # for ultrasound/nuclear med/CT/MRI/DXA/IR

## 2025-01-03 ENCOUNTER — HOSPITAL ENCOUNTER (OUTPATIENT)
Dept: GENERAL RADIOLOGY | Age: 50
Discharge: HOME | End: 2025-01-03
Attending: INTERNAL MEDICINE
Payer: COMMERCIAL

## 2025-01-03 ENCOUNTER — LAB ENCOUNTER (OUTPATIENT)
Dept: LAB | Age: 50
End: 2025-01-03
Attending: STUDENT IN AN ORGANIZED HEALTH CARE EDUCATION/TRAINING PROGRAM
Payer: COMMERCIAL

## 2025-01-03 DIAGNOSIS — K59.00 CONSTIPATION, UNSPECIFIED CONSTIPATION TYPE: ICD-10-CM

## 2025-01-03 DIAGNOSIS — E05.90 SUBCLINICAL HYPERTHYROIDISM: ICD-10-CM

## 2025-01-03 LAB
T4 FREE SERPL-MCNC: 1.3 NG/DL (ref 0.8–1.7)
TSI SER-ACNC: 0.01 UIU/ML (ref 0.55–4.78)

## 2025-01-03 PROCEDURE — 83520 IMMUNOASSAY QUANT NOS NONAB: CPT

## 2025-01-03 PROCEDURE — 74018 RADEX ABDOMEN 1 VIEW: CPT | Performed by: INTERNAL MEDICINE

## 2025-01-03 PROCEDURE — 84439 ASSAY OF FREE THYROXINE: CPT

## 2025-01-03 PROCEDURE — 84445 ASSAY OF TSI GLOBULIN: CPT

## 2025-01-03 PROCEDURE — 36415 COLL VENOUS BLD VENIPUNCTURE: CPT

## 2025-01-03 PROCEDURE — 84443 ASSAY THYROID STIM HORMONE: CPT

## 2025-01-04 LAB — THY STIM IMMUNO: <0.1 IU/L

## 2025-01-05 LAB — THYROTROPIN REC AB: <1.1 IU/L

## 2025-01-07 LAB
HUMAN ANTI-MOUSE ANTIBODIES: <56 NG/ML
HUMAN ANTI-MOUSE ANTIBODIES: <56 NG/ML

## 2025-01-09 LAB — T4 FREE DIALYSIS/MS: 1.3 NG/DL

## 2025-01-31 ENCOUNTER — HOSPITAL ENCOUNTER (OUTPATIENT)
Dept: GENERAL RADIOLOGY | Age: 50
Discharge: HOME | End: 2025-01-31
Attending: INTERNAL MEDICINE
Payer: COMMERCIAL

## 2025-01-31 DIAGNOSIS — M25.562 LEFT KNEE PAIN, UNSPECIFIED CHRONICITY: ICD-10-CM

## 2025-01-31 DIAGNOSIS — Z01.89 ENCOUNTER FOR LOWER EXTREMITY COMPARISON IMAGING STUDY: ICD-10-CM

## 2025-01-31 PROCEDURE — 73564 X-RAY EXAM KNEE 4 OR MORE: CPT | Performed by: INTERNAL MEDICINE

## 2025-01-31 PROCEDURE — 73562 X-RAY EXAM OF KNEE 3: CPT | Performed by: INTERNAL MEDICINE

## 2025-02-05 ENCOUNTER — HOSPITAL ENCOUNTER (OUTPATIENT)
Dept: NUCLEAR MEDICINE | Facility: HOSPITAL | Age: 50
Discharge: HOME | End: 2025-02-05
Attending: STUDENT IN AN ORGANIZED HEALTH CARE EDUCATION/TRAINING PROGRAM
Payer: COMMERCIAL

## 2025-02-05 DIAGNOSIS — E05.90 SUBCLINICAL HYPERTHYROIDISM: ICD-10-CM

## 2025-02-05 PROCEDURE — 78014 THYROID IMAGING W/BLOOD FLOW: CPT | Performed by: STUDENT IN AN ORGANIZED HEALTH CARE EDUCATION/TRAINING PROGRAM

## 2025-02-10 ENCOUNTER — TELEPHONE (OUTPATIENT)
Dept: PHYSICAL THERAPY | Age: 50
End: 2025-02-10

## 2025-03-25 ENCOUNTER — OFFICE VISIT (OUTPATIENT)
Facility: CLINIC | Age: 50
End: 2025-03-25
Payer: COMMERCIAL

## 2025-03-25 VITALS
WEIGHT: 152 LBS | HEART RATE: 67 BPM | BODY MASS INDEX: 25.95 KG/M2 | DIASTOLIC BLOOD PRESSURE: 78 MMHG | RESPIRATION RATE: 16 BRPM | SYSTOLIC BLOOD PRESSURE: 122 MMHG | HEIGHT: 64 IN | OXYGEN SATURATION: 100 %

## 2025-03-25 DIAGNOSIS — E05.90 SUBCLINICAL HYPERTHYROIDISM: Primary | ICD-10-CM

## 2025-03-25 DIAGNOSIS — E55.9 VITAMIN D DEFICIENCY: ICD-10-CM

## 2025-03-25 DIAGNOSIS — E04.2 MULTIPLE THYROID NODULES: ICD-10-CM

## 2025-03-25 PROCEDURE — 99417 PROLNG OP E/M EACH 15 MIN: CPT | Performed by: STUDENT IN AN ORGANIZED HEALTH CARE EDUCATION/TRAINING PROGRAM

## 2025-03-25 PROCEDURE — 99215 OFFICE O/P EST HI 40 MIN: CPT | Performed by: STUDENT IN AN ORGANIZED HEALTH CARE EDUCATION/TRAINING PROGRAM

## 2025-03-25 NOTE — PROGRESS NOTES
Endocrinology Clinic Note    Name: Miriam Alvarado    Date: 3/25/2025      HISTORY OF PRESENT ILLNESS  Miriam Alvarado is a 49 year old female who presents for consultation for subclinical hyperthyroidism.  In addition she has thyroid nodules and has undergone FNA       HPI:Initial visit  - 12/24/2024  - Pt reports having often issues with sore throat and Strep, unsure if it is her tonsils (gets through infection more than>1/yr), felt that her thryoid gland was enlarged.  Found to have an enlarged thyroid gland by her providers  -COVID tested +ve in x 3 for the past 3 yrs  -Had a thyroid ultrasound which showed multiple nodules.  She underwent biopsy (FNA) for these which were negative for malignancy  -During labs noted to have subclinical hypothyroidism x 2.  -Was told that she had some thyroid issues during pregnancy - about 27 yrs ago.  Never been on any thyroid medications.  -Reports some burning sensation, tingling sensation in the neck.  In addition she feels the same sensation in lower extremities.  Is losing weight -on semaglutide.    -Denies any compressive symptoms in her neck.   -No history of radiation   -Takes Supplements: Milk thistle, Magnesium 400, Digestive enzyme,Probiotic, UroVaginal probiotic, Probiotic fiber gummies, Apple cider vinegar, hardcore liquid heat (hydroxy cut)     ROS: Negative for Tremors, Nervousness, emotional lability, palpitations, muscle weakness, eye and vision changes, skin changes, dyspnea.   -Has weight loss: on semaglutide since Aug 2024., lost 20 pounds.   -Denies diarrhea: Patient reports constipation + relieved with special tea   -Denies history of amiodarone use:  Reproductive history: irregular periods and delayed periods since IUD x 5 yrs.   Smoking history: one miscarriage in 2007, 2 kids, youngest is 23 (2000). Non smoker   (+) Fhx of thyroid disease? Maternal aunt with thyroid cancer  Recent viral illness?  Not recent does get viral infections as mentioned  above  Radiation exposure?  None  Neck surgery?  None    Current thyroid medications: none    Regard thyroid nodules:  -enlarged thyroid at PCP office 5/2024  -S/p Bx of both nodules  -evaluated by ENT in 7/2024: plan to repeat labs and thyroid nodules  -US showed 2 nodules:   -CT chest: (non contrast) 8/21/24: Diffuse thyroid heterogeneity with asymmetric enlargement of the right thyroid lobe.  This would be best assessed with thyroid ultrasound.       Interval History:3/25/2025  -Patient presents today after getting thyroid uptake and scan as well as repeating her blood work  -Reports doing well.  Denies any symptoms of hyperthyroid including: Palpitations, weight loss, diarrhea.  No falls or fractures.    -Denies any change in the size of her neck nodules.  Denies any compressive symptoms.  -Not on any biotin containing supplements.    REVIEW OF SYSTEMS  Ten point review of systems has been performed and is otherwise negative and/or non-contributory, except as described above.    Medications:     Current Outpatient Medications:     SEMAGLUTIDE-WEIGHT MANAGEMENT SC, Inject into the skin., Disp: , Rfl:     Meloxicam 15 MG Oral Tab, Take 1 tablet (15 mg total) by mouth daily as needed for Pain. Do NOT take with other NSAIDs, Disp: 20 tablet, Rfl: 0    valACYclovir 500 MG Oral Tab, Take 1 tablet (500 mg total) by mouth As Directed. For herpes flares: 500 mg twice daily for 3 days, Disp: 30 tablet, Rfl: 0     Allergies:   Allergies[1]    Social History:   Social History     Socioeconomic History    Marital status:    Tobacco Use    Smoking status: Never    Smokeless tobacco: Never   Vaping Use    Vaping status: Never Used   Substance and Sexual Activity    Alcohol use: Yes     Alcohol/week: 4.0 standard drinks of alcohol     Types: 4 Standard drinks or equivalent per week     Comment: Weekend Drinker    Drug use: Never    Sexual activity: Yes     Partners: Male     Birth control/protection: I.U.D.   Other  Topics Concern    Caffeine Concern No    Exercise No    Seat Belt No    Special Diet No    Stress Concern No    Weight Concern Yes       Medical History:   Past Medical History:    Asthma (HCC)    Dx'd as child, but no issues as adult    Sleep apnea         Surgical history:   Past Surgical History:   Procedure Laterality Date    Cyst aspiration left  2019    Cyst removal  2017    Near breast and on back          Other surgical history      Skin surgery  2018    Removal of cysts       PHYSICAL EXAMINATION:  /78   Pulse 67   Resp 16   Ht 5' 4\" (1.626 m)   Wt 152 lb (68.9 kg)   LMP 2025   SpO2 100%   BMI 26.09 kg/m²     CONSTITUTIONAL:  awake, alert, cooperative, no apparent distress, and appears stated age  PSYCH: normal affect  EYES:  No proptosis,  conjunctiva normal  ENT:  Normocephalic, atraumatic  NECK:  Supple, symmetrical, mild thyroid fullness felt on right side     LUNGS: breathing comfortably  CARDIOVASCULAR:  regular rate   ABDOMEN:  soft  SKIN:  no rashes and no lesions  EXTREMITIES: no edema      Labs:  Lab Results   Component Value Date    T4F 1.3 2025    TSH 0.011 (L) 2025      Recent Labs     24  1022 10/09/24  0835 25  1008   T4F 1.1 1.4 1.3   TSH 0.017* <0.008* 0.011*        Imaging:  US THYROID (CPT=76536)    Result Date: 2024  MEASUREMENTS:   RIGHT LOBE:  6.0 x 2.4 x 3.0 cm   LEFT LOBE:  5.0 x 2.0 by cm   ISTHMUS:  0.3 cm      NODULES:  Bilateral thyroid nodules are noted.      An index right thyroid nodule is noted measuring up to 2.8 x 1.3 x 2.8 cm.  This nodule is solid and hypoechoic.TR4 - Moderately Suspicious (FNA if > or = 1.5 cm.  Follow if > or = 1 cm.).       An additional nodule in the mid right thyroid measures up to 0.9 x 0.5 x 0.9 cm.  This nodule is solid and cystic and isoechoic.TR2 - Not Suspicious (No FNA).       Additional right thyroid nodules are noted.      The largest left thyroid nodule measures up to 1.7 x 1.3 x 1.6 cm.   This nodule is solid and isoechoic.  Peripheral calcifications are noted.TR4 - Moderately Suspicious (FNA if > or = 1.5 cm.  Follow if > or = 1 cm.).       An additional July in the midpole of the left thyroid measures up to 1.0 x 0.8 x 1.0 cm.  This nodule is solid and hypoechoic.TR4 - Moderately Suspicious (FNA if > or = 1.5 cm.  Follow if > or = 1 cm.).       Additional left thyroid nodules are noted.      OTHER:  None.   CONCLUSION:  1. Bilateral thyroid nodules as above.  Ultrasound-guided biopsy of the largest nodule in the each lobe of the thyroid is recommended.   ACR TI-RADS recommendations: TR5 - FNA if greater than or equal to 1 cm, follow-up if 0.5-0.9 cm every year for 5 years. TR4 - FNA if greater than or equal to 1.5 cm, follow-up if 1-1.4 cm in 1, 2, 3 and 5 years. TR3 - FNA if greater than or equal to 2.5 cm, follow-up if 1.5-2.4 cm in 1, 3 and 5 years TR1 and TR2 - no FNA or follow-up  Please note ACR TI-RADS recommendations are based on imaging features and size of the nodule only.  In certain clinical circumstances additional or alternative evaluation may be indicated.   LOCATION:  [facility]        Dictated by (CST): Korey Mesa MD on 5/31/2024 at 6:37 PM     Finalized by (CST): Korey Mesa MD on 5/31/2024 at 6:46 PM       2/5/2025: Thyroid uptake and scan    FINDINGS:    IMAGES:  There is moderately increased radiotracer uptake involving a right lower pole thyroid nodule that was seen on recent ultrasound.  There is a mildly cold thyroid nodule within the right midpole.  There is overall slight decreased radiotracer  uptake of the background of the right and left thyroid nodule.     6 HOUR UPTAKE:   24 %  (normal = 7-25%)                   Impression   CONCLUSION:       1. There is a mildly hyperactive right lower pole thyroid nodule.     2. Overall, the iodine uptake of the thyroid is within normal limits.     ASSESSMENT/PLAN:    #Subclinical hyperthyroidism: Without any features of  osteoporosis or cardiac history.     Component      Latest Ref Rng 6/8/2024 10/9/2024 1/3/2025   T4,Free (Direct)      0.8 - 1.7 ng/dL 1.1  1.4  1.3    TSH      0.550 - 4.780 uIU/mL 0.017 (L)  <0.008 (L)  0.011 (L)    T3 FREE      2.40 - 4.20 pg/mL 3.60      Thy Stim Immuno      0.00 - 0.55 IU/L   <0.10    HUMAN ANTI-MOUSE ANTIBODIES      0 - 74 ng/mL   <56    T4 Free Dialysis/MS      ng/dL   1.3    Thyrotropin Rec Ab      0.00 - 1.75 IU/L   <1.10    - Was noted to have subclinical hyperthyroidism first on 6/8/2024.  Labs x 3 since then consistent with subclinical hyperthyroidism with suppressed TSH and normal free T4.    --Assay interference ruled out with negative Hama antibodies.  T4 by direct dialysis consistent with free T4 levels.  -Hama antibodies negative, TSI negative  -Thyroid uptake and scan shows mildly hyperactive right lower pole thyroid nodule, but overall iodine uptake of the thyroid gland within normal limits.  - Clinically euthyroid  - Secondary causes of low TSH including pregnancy, use of levothyroxine suppressive doses, glucocorticoids, opioids ruled out.  - Given normal FT3/4 central hypothyroidism ruled out  - No history of recent illness that could cause sick euthyroid syndrome  - Discussed the diagnosis and that there is increased risk of progression to overt hyperthyroidism when TSH is < 0.1, symptoms are present and when underlying pathology is toxic adenoma.  -We reviewed the following treatment options:  -Radio iodine therapy: Preferred first-line treatment for subclinical toxic adenoma.  However considering patient has other thyroid nodules surgery may be indicated  -Surgery: Surgical resection as either lobectomy or hemithyroidectomy especially since patient has large nodules.  Although patient does not have any compressive symptoms  -We also discussed the option of trying antithyroid therapy, considering patient has subclinical hyperthyroidism.  Discussed that this would require  frequent monitoring of her labs while on ATD.  Also discussed about side effects and risks of ATD therapy.  Patient would like to first wait for her thyroid ultrasound to assess current.  She is open to the option of surgery, provided subsequent FNAs with no longer required as she reported discomfort in her neck after FNA  Plan:  -Will proceed with repeat thyroid ultrasound to assess growth of thyroid tissue  -Also obtain PTH, calcium, vitamin D levels.  And TFTs  -Pending on results we will consider surgical resection versus RAYMUNDO.  -Patient to make an appointment with ENT for further discussion.(She is supposed to follow-up with them 1 year after her thyroid ultrasound.      #Thyroid nodules:  -Incidentally noted on physical exam.  -Confirmed with thyroid ultrasound 5/2024.  I am unable to view the images, but per report noted to have 2.8 cm TR 4 right thyroid nodule and 1.7 cm TR 4 left thyroid nodule noted.  -Other nodules noted but do not meet criteria for FNA  -Patient underwent FNA biopsies consistent with benign follicular nodule.    Discussed common occurrence of thyroid nodules in the population approx 50-60% of the population  -Discussed that since nodule is benign then plan to follow with yearly thyroid ultrasound since ~3% of benign nodules can become malignant  -Repeat thyroid ultrasound.         ICD-10-CM    1. Subclinical hyperthyroidism  E05.90 TSH and Free T4 [E]     Comp Metabolic Panel (14) [E]     Vitamin D [E]      2. Multiple thyroid nodules  E04.2 US THYROID (CPT=76536)     PTH, Intact [E]     Vitamin D [E]     Calcium, Ionized [E]     MAGNESIUM [622][Q]     Phosphorus [E]      3. Vitamin D deficiency  E55.9 Vitamin D [E]          The above plan was discussed in detail with the patient who verbalized understanding and agreement.      A total of 65 minutes was spent today on obtaining history, reviewing outside records, reviewing pertinent labs/imaging, reviewing relevant pathophysiology with  patient, evaluating patient, providing multiple treatment options, communicating with patient's other providers as appropriate, and completing documentation and orders.      Nidhi Phelan MD  [facility] Endocrinology  3/25/2025       Note to patient: The 21 Century Cures Act makes medical notes like these available to patients in the interest of transparency. However, be advised this is a medical document. It is intended as peer to peer communication. It is written in medical language and may contain abbreviations or verbiage that are unfamiliar. It may appear blunt or direct. Medical documents are intended to carry relevant information, facts as evident, and the clinical opinion of the practitioner.      In reviewing this note, please be advised that Dragon Voice Recognition software used to dictate the note may have made errors in recognizing some of the words or phrases.            [1] No Known Allergies

## 2025-03-25 NOTE — PATIENT INSTRUCTIONS
Summary of our visit:  Lets get labs in 1 week  Please schedule your thyroid US (we will be in touch after the thyroid US)  Please make an appt with ENT - Dr. Newsome to discuss surgical options  (Hold biotin containing supplements for 5 days before labs       General follow up information:  Please let us know if you require any refills at least 1 week prior to your medication running out. If you do run out of medication, please call our office ASAP to request refills (do not wait until your follow up).  Please call us if you experience any problems with insurance coverage of medication, lab work, or imaging.   Lab results and imaging will typically be reviewed at follow up appointments, or within 3-5 business days of ALL results being in if you do not have an appointment scheduled in the near future. Our office will contact you for any abnormal results requiring more urgent follow up or action.   The on-call pager is for urgent matters only. If you are a type 1 diabetic and run out of insulin after business hours 8AM-4PM, you may call the on-call pager for a refill to a 24 hour pharmacy. If you have adrenal insufficiency and run out of steroids, you may call the on-call pager for a refill to a 24 hour pharmacy. All other refill requests should be requested during business hours.    Return Visit   [x] Dr. Phelan in 3 months   [x] Virtual visit    [x]  labs  [x] Central scheduling # for ultrasound/nuclear med/CT/MRI/DXA/IR

## 2025-04-18 ENCOUNTER — HOSPITAL ENCOUNTER (OUTPATIENT)
Dept: ULTRASOUND IMAGING | Age: 50
Discharge: HOME | End: 2025-04-18
Attending: STUDENT IN AN ORGANIZED HEALTH CARE EDUCATION/TRAINING PROGRAM
Payer: COMMERCIAL

## 2025-04-18 DIAGNOSIS — E04.2 MULTIPLE THYROID NODULES: ICD-10-CM

## 2025-04-18 PROCEDURE — 76536 US EXAM OF HEAD AND NECK: CPT | Performed by: STUDENT IN AN ORGANIZED HEALTH CARE EDUCATION/TRAINING PROGRAM

## 2025-05-23 ENCOUNTER — OFFICE VISIT (OUTPATIENT)
Facility: LOCATION | Age: 50
End: 2025-05-23
Payer: COMMERCIAL

## 2025-05-23 DIAGNOSIS — M79.675 PAIN IN TOES OF BOTH FEET: ICD-10-CM

## 2025-05-23 DIAGNOSIS — M79.674 PAIN IN TOES OF BOTH FEET: ICD-10-CM

## 2025-05-23 DIAGNOSIS — M20.41 HAMMERTOES OF BOTH FEET: ICD-10-CM

## 2025-05-23 DIAGNOSIS — L84 PRE-ULCERATIVE CALLUSES: Primary | ICD-10-CM

## 2025-05-23 DIAGNOSIS — M20.42 HAMMERTOES OF BOTH FEET: ICD-10-CM

## 2025-05-23 NOTE — PROGRESS NOTES
[facility]  Progress Note      Miriam Alvarado is a 49 year old female.   Chief Complaint   Patient presents with    Foot Pain     Establishing care with podiatrist  Patient is having issues with bilateral 5th toe corns           HPI:     The following individual(s) verbally consented to be recorded using ambient AI listening technology and understand that they can each withdraw their consent to this listening technology at any point by asking the clinician to turn off or pause the recording:      History of Present Illness  Miriam Alvarado is a 49 year old female who presents with foot pain related to bunions and corns.    She experiences significant discomfort in her feet, particularly when wearing dress shoes for work. The pain is associated with bunions and corns, which are exacerbated by high heels and pointy-toed shoes. The discomfort is not constant but occurs with specific footwear.    She has a history of bunions, previously evaluated by a podiatrist over ten years ago. At that time, she was considered for bunion surgery but opted against it due to lack of significant pain. Currently, the bunions do not cause issues unless specific shoes are worn.    She has broken her pinky toes multiple times, leading to deformities and increased pressure on certain areas of her feet, resulting in corns and calluses. She has attempted home management by trimming them with a nail clipper, which has led to self-inflicted injury.    Her current job, which she has held for over a year, requires wearing dress shoes, contributing to her foot issues.    No current pain from her bunions unless wearing specific shoes. Persistent corns and calluses despite regular pedicures.      Allergies: Patient has no known allergies.   Current Medications[1]   Past Medical History[2]   Past Surgical History[3]   Family History[4]   Social Hx on file[5]        REVIEW OF SYSTEMS:     10 point ROS completed and was negative unless stated in  HPI.      EXAM:     GENERAL: well developed, well nourished, in no apparent distress  EXTREMITIES:  1. Integument: Skin appears moist, warm, and supple. There are no color changes. No open lesions. No macerations. HPK to bilateral lateral aspects of fifth digits, as well as plantar medial right IPJ of the hallux.  No open ulcerations noted upon debridement and no current signs of infection bilaterally  2. Vascular: Dorsalis pedis 2/4 bilateral and posterior tibial pulses 2/4 bilateral, capillary refill normal.  3. Neurological: Gross sensation intact via light touch bilaterally.  Normal sharp/dull sensation  4. Musculoskeletal: All muscle groups are graded 5/5 in the foot and ankle.  Flexion contractures to lesser digits, bilaterally     ASSESSMENT AND PLAN:   Diagnoses and all orders for this visit:    Pre-ulcerative calluses    Hammertoes of both feet    Pain in toes of both feet        Plan:   -Patient was seen and evaluated today in clinic.  Chart history reviewed.    Assessment & Plan  Corns and calluses  Chronic corns and calluses due to friction and pressure from dress shoes, exacerbated by hammer toe deformity and previous fractures.  - Trim calluses today.  No open ulcerations or signs of infection  - Provide Mediplast (salicylic acid) for home use, instruct to change daily and monitor for skin maceration. Discontinue if infection or excessive maceration occurs.  - Provide offloading pads and toe sleeves to reduce pressure and friction.  - Recommend supportive shoe options.  - Instruct to moisturize feet regularly.    Hammer toe  Hammer toe deformity increasing pressure and friction, contributing to corns and calluses.  - Provide offloading pads and toe sleeves.  - Recommend supportive shoes.  - Can discuss surgical options if conservative measures fail      -All of the patient's questions and concerns were addressed.  They indicated their understanding of these issues and agrees to the plan.      RTC 4  graciela Richter DPM, AACFAS        2025    [facility]  [address] [place] [id number]   [email or web address]            Dragon speech recognition software was used to prepare this note.  Errors in word recognition may occur.  Please contact me with any questions/concerns with this note.          [1]   Current Outpatient Medications   Medication Sig Dispense Refill    SEMAGLUTIDE-WEIGHT MANAGEMENT SC Inject into the skin.      Meloxicam 15 MG Oral Tab Take 1 tablet (15 mg total) by mouth daily as needed for Pain. Do NOT take with other NSAIDs 20 tablet 0    valACYclovir 500 MG Oral Tab Take 1 tablet (500 mg total) by mouth As Directed. For herpes flares: 500 mg twice daily for 3 days 30 tablet 0   [2]   Past Medical History:   Asthma (HCC)    Dx'd as child, but no issues as adult    Sleep apnea   [3]   Past Surgical History:  Procedure Laterality Date    Cyst aspiration left  2019    Cyst removal  2017    Near breast and on back          Other surgical history      Skin surgery  2018    Removal of cysts   [4]   Family History  Problem Relation Age of Onset    Diabetes Father     Other (Parkinson's) Father     Stroke Father     Diabetes Paternal Grandmother     Dementia Maternal Grandfather     Diabetes Maternal Grandfather    [5]   Social History  Socioeconomic History    Marital status:    Tobacco Use    Smoking status: Never    Smokeless tobacco: Never   Vaping Use    Vaping status: Never Used   Substance and Sexual Activity    Alcohol use: Yes     Alcohol/week: 4.0 standard drinks of alcohol     Types: 4 Standard drinks or equivalent per week     Comment: Weekend Drinker    Drug use: Never    Sexual activity: Yes     Partners: Male     Birth control/protection: I.U.D.   Other Topics Concern    Caffeine Concern No    Exercise No    Seat Belt No    Special Diet No    Stress Concern No    Weight Concern Yes

## 2025-05-23 NOTE — PROGRESS NOTES
The following individual(s) verbally consented to be recorded using ambient AI listening technology and understand that they can each withdraw their consent to this listening technology at any point by asking the clinician to turn off or pause the recording:    Patient name: Miriam Alvarado  Additional names:

## 2025-06-20 ENCOUNTER — OFFICE VISIT (OUTPATIENT)
Facility: LOCATION | Age: 50
End: 2025-06-20
Payer: COMMERCIAL

## 2025-06-20 DIAGNOSIS — M79.675 PAIN IN TOES OF BOTH FEET: ICD-10-CM

## 2025-06-20 DIAGNOSIS — M20.41 HAMMERTOES OF BOTH FEET: ICD-10-CM

## 2025-06-20 DIAGNOSIS — M20.42 HAMMERTOES OF BOTH FEET: ICD-10-CM

## 2025-06-20 DIAGNOSIS — M79.674 PAIN IN TOES OF BOTH FEET: ICD-10-CM

## 2025-06-20 DIAGNOSIS — L84 PRE-ULCERATIVE CALLUSES: Primary | ICD-10-CM

## 2025-06-20 PROCEDURE — 99213 OFFICE O/P EST LOW 20 MIN: CPT | Performed by: PODIATRIST

## 2025-06-20 NOTE — PROGRESS NOTES
[facility]  Progress Note      Miriam Alvarado is a 49 year old female.   Chief Complaint   Patient presents with    Follow - Up     Follow up on bilateral foot pain  Patient is doing the same as last time, lotion recommendation did not help as much and mediplast is helping but calluses are still there         HPI:     The following individual(s) verbally consented to be recorded using ambient AI listening technology and understand that they can each withdraw their consent to this listening technology at any point by asking the clinician to turn off or pause the recording:      History of Present Illness  Miriam Alvarado is a 49 year old female who presents with chronic calluses on her fifth toes.    She has had chronic calluses on her toes for over twenty years. The calluses persist despite using treatments such as moisturizing lotions and salicylic acid pads for the last month. They are not painful but limit her choice of footwear, particularly for work. The calluses have not significantly improved with the treatments tried so far, and they tend to peel slightly but do not resolve.    She has been using a moisturizer regularly, which she feels has helped slightly in reducing the thickness of the calluses. During pedicures, attempts are made to reduce the calluses, but this incurs additional costs.    Her work requires her to wear specific types of shoes, which may contribute to the development and persistence of the calluses. She is trying to avoid shoes that cause friction and is considering options to offload pressure from the affected areas.    No pain associated with the calluses.      Allergies: Patient has no known allergies.   Current Medications[1]   Past Medical History[2]   Past Surgical History[3]   Family History[4]   Social Hx on file[5]        REVIEW OF SYSTEMS:     10 point ROS completed and was negative unless stated in HPI.      EXAM:     GENERAL: well developed, well nourished, in no apparent  distress  EXTREMITIES:  1. Integument: Skin appears moist, warm, and supple. There are no color changes. No open lesions. No macerations. HPK to bilateral lateral aspects of fifth digits.  Previous plantar medial IPJ of the right hallux currently under control.  No open ulcerations noted upon debridement and no current signs of infection bilaterally.  Area of hardened skin noted to dorsal aspect of right fourth digit with no current signs of infection.  2. Vascular: Dorsalis pedis 2/4 bilateral and posterior tibial pulses 2/4 bilateral, capillary refill normal.  3. Neurological: Gross sensation intact via light touch bilaterally.  Normal sharp/dull sensation  4. Musculoskeletal: All muscle groups are graded 5/5 in the foot and ankle.  Flexion contractures to lesser digits, bilaterally           ASSESSMENT AND PLAN:   Diagnoses and all orders for this visit:    Pre-ulcerative calluses    Hammertoes of both feet    Pain in toes of both feet        Plan:   -Patient was seen and evaluated today in clinic.  Chart history reviewed.    Assessment & Plan  Corns and Calluses  Chronic corns and calluses due to friction and pressure from footwear. Moisturizing and offloading have improved the condition. Surgical excision may not prevent recurrence.  - Continue moisturizing twice daily, especially after showering.  - Calluses of bilateral fifth digits debrided today utilizing #15 blade without incident  - Use offloading pads and toe sleeves with silicone layer to prevent friction.  - Avoid tight or pointy shoes to minimize pressure and friction.  - Consider surgical excision if condition becomes painful or worsens.    Hammer Toe  Hammer toe causing corns and calluses due to abnormal toe bending and pressure points.  - Use offloading pads and toe sleeves to minimize pressure and friction.    Cyst on Toe  Possible cyst due to chronic rubbing or pressure to right fourth digit. Differential includes skin changes or small cyst.   Asymptomatic  - Consider further investigation if symptoms change or worsen.  Will plan to monitor      -All of the patient's questions and concerns were addressed.  They indicated their understanding of these issues and agrees to the plan.    Time spent reviewing pertinent information from patient's chart, reviewing any pertinent imaging, obtaining history and physical exam, discussing and mutually agreeing on a treatment plan, and documenting encounter: 20 minutes    RTC prn    Arias Richter DPM, AACFAS        2025    [address] [address]   [email or web address]            Dragon speech recognition software was used to prepare this note.  Errors in word recognition may occur.  Please contact me with any questions/concerns with this note.        [1]   Current Outpatient Medications   Medication Sig Dispense Refill    SEMAGLUTIDE-WEIGHT MANAGEMENT SC Inject into the skin.      Meloxicam 15 MG Oral Tab Take 1 tablet (15 mg total) by mouth daily as needed for Pain. Do NOT take with other NSAIDs 20 tablet 0    valACYclovir 500 MG Oral Tab Take 1 tablet (500 mg total) by mouth As Directed. For herpes flares: 500 mg twice daily for 3 days 30 tablet 0   [2]   Past Medical History:   Asthma (HCC)    Dx'd as child, but no issues as adult    Sleep apnea   [3]   Past Surgical History:  Procedure Laterality Date    Cyst aspiration left  2019    Cyst removal  2017    Near breast and on back          Other surgical history      Skin surgery  2018    Removal of cysts   [4]   Family History  Problem Relation Age of Onset    Diabetes Father     Other (Parkinson's) Father     Stroke Father     Diabetes Paternal Grandmother     Dementia Maternal Grandfather     Diabetes Maternal Grandfather    [5]   Social History  Socioeconomic History    Marital status:    Tobacco Use    Smoking status: Never    Smokeless tobacco: Never   Vaping Use    Vaping  status: Never Used   Substance and Sexual Activity    Alcohol use: Yes     Alcohol/week: 4.0 standard drinks of alcohol     Types: 4 Standard drinks or equivalent per week     Comment: Weekend Drinker    Drug use: Never    Sexual activity: Yes     Partners: Male     Birth control/protection: I.U.D.   Other Topics Concern    Caffeine Concern No    Exercise No    Seat Belt No    Special Diet No    Stress Concern No    Weight Concern Yes

## 2025-08-07 DIAGNOSIS — A60.00 GENITAL HERPES SIMPLEX, UNSPECIFIED SITE: ICD-10-CM

## 2025-08-08 RX ORDER — VALACYCLOVIR HYDROCHLORIDE 500 MG/1
500 TABLET, FILM COATED ORAL AS DIRECTED
Qty: 30 TABLET | Refills: 0 | Status: SHIPPED | OUTPATIENT
Start: 2025-08-08